# Patient Record
Sex: MALE | Race: WHITE | URBAN - METROPOLITAN AREA
[De-identification: names, ages, dates, MRNs, and addresses within clinical notes are randomized per-mention and may not be internally consistent; named-entity substitution may affect disease eponyms.]

---

## 2017-03-06 ENCOUNTER — OFFICE VISIT (OUTPATIENT)
Dept: FAMILY MEDICINE | Facility: CLINIC | Age: 62
End: 2017-03-06
Payer: COMMERCIAL

## 2017-03-06 VITALS
TEMPERATURE: 97.8 F | BODY MASS INDEX: 21.43 KG/M2 | WEIGHT: 158.2 LBS | DIASTOLIC BLOOD PRESSURE: 70 MMHG | HEART RATE: 95 BPM | SYSTOLIC BLOOD PRESSURE: 116 MMHG | HEIGHT: 72 IN | OXYGEN SATURATION: 95 %

## 2017-03-06 DIAGNOSIS — M79.671 FOOT PAIN, BILATERAL: Primary | ICD-10-CM

## 2017-03-06 DIAGNOSIS — Z72.0 TOBACCO ABUSE: ICD-10-CM

## 2017-03-06 DIAGNOSIS — M79.672 FOOT PAIN, BILATERAL: Primary | ICD-10-CM

## 2017-03-06 DIAGNOSIS — Q66.70 HIGH ARCHES: ICD-10-CM

## 2017-03-06 DIAGNOSIS — R91.8 PULMONARY NODULES: ICD-10-CM

## 2017-03-06 DIAGNOSIS — R91.1 PULMONARY NODULE: ICD-10-CM

## 2017-03-06 DIAGNOSIS — F17.200 TOBACCO USE DISORDER: ICD-10-CM

## 2017-03-06 PROCEDURE — 99214 OFFICE O/P EST MOD 30 MIN: CPT | Performed by: FAMILY MEDICINE

## 2017-03-06 RX ORDER — VARENICLINE TARTRATE 1 MG/1
1 TABLET, FILM COATED ORAL 2 TIMES DAILY
Qty: 56 TABLET | Refills: 2 | Status: SHIPPED | OUTPATIENT
Start: 2017-03-06

## 2017-03-06 NOTE — PATIENT INSTRUCTIONS
[facility]    If you have any questions regarding to your visit please contact your care team:       Team Purple:   Clinic Hours Telephone Number   [address] Dr.   7am-7pm  Monday - Thursday   7am-5pm  Fridays  [phone]  (Appointment scheduling available 24/7)    Questions about your Visit?   Team Line:  [phone]   Urgent Care - [place] and [facility] - 11am-9pm Monday-Friday Saturday-Sunday- 9am-5pm   [place] - 5pm-9pm Monday-Friday Saturday-Sunday- 9am-5pm  [phone] - [facility]  [phone] - [place]       What options do I have for visits at the clinic other than the traditional office visit?  To expand how we care for you, many of our providers are utilizing electronic visits (e-visits) and telephone visits, when medically appropriate, for interactions with their patients rather than a visit in the clinic.   We also offer nurse visits for many medical concerns. Just like any other service, we will bill your insurance company for this type of visit based on time spent on the phone with your provider. Not all insurance companies cover these visits. Please check with your medical insurance if this type of visit is covered. You will be responsible for any charges that are not paid by your insurance.      E-visits via [employer]:  generally incur a $35.00 fee.  Telephone visits:  Time spent on the phone: *charged based on time that is spent on the phone in increments of 10 minutes. Estimated cost:   5-10 mins $30.00   11-20 mins. $59.00   21-30 mins. $85.00     Use [employer] (secure email communication and access to your chart) to send your primary care provider a message or make an appointment. Ask someone on your Team how to sign up for [employer].  For a Price Quote for your services, please call our Consumer Price Line at [phone].  As always, Thank you for trusting us with your health care  needs!    Discharged By: An

## 2017-03-06 NOTE — NURSING NOTE
Chief Complaint   Patient presents with     Foot Pain     left foot pain x 6 months       Initial /70  Pulse 95  Temp 97.8  F (36.6  C) (Oral)  Ht 6' (1.829 m)  Wt 158 lb 3.2 oz (71.8 kg)  SpO2 95%  BMI 21.46 kg/m2 Estimated body mass index is 21.46 kg/(m^2) as calculated from the following:    Height as of this encounter: 6' (1.829 m).    Weight as of this encounter: 158 lb 3.2 oz (71.8 kg).  Medication Reconciliation: complete     An PINEDA Don

## 2017-03-06 NOTE — PROGRESS NOTES
SUBJECTIVE:                                                    Reese Torres is a 61 year old male who presents to clinic today for the following health issues:    Musculoskeletal problem/pain      Duration: 6 month     Description  Location: bilateral foot pain but left foot is the worse     Intensity:  mild    Accompanying signs and symptoms: none    History  Previous similar problem: no   Previous evaluation:  none    Precipitating or alleviating factors:  Trauma or overuse: no   Aggravating factors include: walking    Therapies tried and outcome: nothing     Pain is in the forefoot; in the left a little on the right foot.  While walking (when puts weight on the ball of the foot), has needle prick sensation in some toes (middle ones).  It is more persistent lately.    Problem list and histories reviewed & adjusted, as indicated.  Additional history: as documented    Patient Active Problem List   Diagnosis     Hyperlipidemia LDL goal <100     Prediabetes     History reviewed. No pertinent past surgical history.    Social History   Substance Use Topics     Smoking status: Current Every Day Smoker     Types: Cigarettes     Smokeless tobacco: Not on file     Alcohol use Yes     Family History   Problem Relation Age of Onset     Glaucoma Father          Reviewed and updated as needed this visit by Provider    ROS:  Constitutional, HEENT, cardiovascular, pulmonary, gi and gu systems are negative, except as otherwise noted.    OBJECTIVE:                                                    /70  Pulse 95  Temp 97.8  F (36.6  C) (Oral)  Ht 6' (1.829 m)  Wt 158 lb 3.2 oz (71.8 kg)  SpO2 95%  BMI 21.46 kg/m2  Body mass index is 21.46 kg/(m^2).  GENERAL: healthy, alert and no distress  NECK: no adenopathy and thyroid normal to palpation  RESP: lungs clear to auscultation - no rales, rhonchi or wheezes  CV: regular rate and rhythm, no murmur, click or rub, no peripheral edema   ABDOMEN: soft, nontender, no masses  and bowel sounds normal  MS:   Feet:   - normal skin color with good sensation and pulses bilaterally.   - high arches   - vague tenderness on the left fore foot below mid toes          ASSESSMENT/PLAN:                                                    (M79.671,  M79.672) Foot pain, bilateral  (primary encounter diagnosis)  Comment: Differentials: Nerve entrapment. neuroma, metatarsalgia. Discussed evaluation by the foot specialist  Plan: Podiatry    (Q66.7) High arches: feet, Active   Plan: Podiatry evaluation    (F17.200) Tobacco use disorder  Comment: He is in the process of quitting. On chantix. Quit date this Friday; 3/10/17 when started chantix  Plan: Commended him for the efforts and encouraged his to keep working towards quitting completely and staying tobacco free.            Refill chantix.    (R91.8) Pulmonary nodules  Comment: Found on low dose CT scan 3/31/2016 follow up recommended in 1 year  Plan: Ordered CT scan    Follow up in 1 month or sooner with concerns    Lewis Puga MD  [facility]

## 2017-03-06 NOTE — MR AVS SNAPSHOT
After Visit Summary   3/6/2017    Reese Torres    MRN: [id number]           Patient Information     Date Of Birth          1955        Visit Information        Provider Department      3/6/2017 5:00 PM Lewis Puga MD [facility]        Today's Diagnoses     Foot pain, bilateral    -  1    High arches: feet        Tobacco use disorder        Pulmonary nodules          Care Instructions    [facility]    If you have any questions regarding to your visit please contact your care team:       Team Purple:   Clinic Hours Telephone Number   [address]IR [address] Dr.   7am-7pm  Monday - Thursday   7am-5pm  Fridays  [phone]  (Appointment scheduling available 24/7)    Questions about your Visit?   Team Line:  [phone]   Urgent Care - [place] and [facility] - 11am-9pm Monday-Friday Saturday-Sunday- 9am-5pm   [place] - 5pm-9pm Monday-Friday Saturday-Sunday- 9am-5pm  [phone] - [facility]  [phone] - [place]       What options do I have for visits at the clinic other than the traditional office visit?  To expand how we care for you, many of our providers are utilizing electronic visits (e-visits) and telephone visits, when medically appropriate, for interactions with their patients rather than a visit in the clinic.   We also offer nurse visits for many medical concerns. Just like any other service, we will bill your insurance company for this type of visit based on time spent on the phone with your provider. Not all insurance companies cover these visits. Please check with your medical insurance if this type of visit is covered. You will be responsible for any charges that are not paid by your insurance.      E-visits via [employer]:  generally incur a $35.00 fee.  Telephone visits:  Time spent on the phone: *charged based on time that is spent on the phone in increments of 10  minutes. Estimated cost:   5-10 mins $30.00   11-20 mins. $59.00   21-30 mins. $85.00     Use [employer] (secure email communication and access to your chart) to send your primary care provider a message or make an appointment. Ask someone on your Team how to sign up for [employer].  For a Price Quote for your services, please call our Good Deal Line at [phone].  As always, Thank you for trusting us with your health care needs!    Discharged By: An          Follow-ups after your visit        Additional Services     PODIATRY/FOOT & ANKLE SURGERY REFERRAL       Your provider [place] you to: [facility] [facility] [phone]   [email or web address]    Please be aware that coverage of these services is subject to the terms and limitations of your health insurance plan.  Call member services at your health plan with any benefit or coverage questions.      Please bring the following to your appointment:  >>   Any x-rays, CTs or MRIs which have been performed.  Contact the facility where they were done to arrange for  prior to your scheduled appointment.    >>   List of current medications   >>   This referral request   >>   Any documents/labs given to you for this referral                  Who to contact     If you have questions or need follow up information about today's clinic visit or your schedule please contact [facility] directly at [phone].  Normal or non-critical lab and imaging results will be communicated to you by MyChart, letter or phone within 4 business days after the clinic has received the results. If you do not hear from us within 7 days, please contact the clinic through [employer] or phone. If you have a critical or abnormal lab result, we will notify you by phone as soon as possible.  Submit refill requests through AlephD or call your pharmacy and they will forward the refill request to us. Please allow 3 business days for your  "refill to be completed.          Additional Information About Your Visit        [employer] Information     [employer] lets you send messages to your doctor, view your test results, renew your prescriptions, schedule appointments and more. To sign up, go to [email or web address] . Click on \"Log in\" on the left side of the screen, which will take you to the Welcome page. Then click on \"Sign up Now\" on the right side of the page.     You will be asked to enter the access code listed below, as well as some personal information. Please follow the directions to create your username and password.     Your access code is: [email or web address]  Expires: 3/20/2017  8:03 AM     Your access code will  in 90 days. If you need help or a new code, please call your [place] clinic or [phone].        Care EveryWhere ID     This is your Care EveryWhere ID. This could be used by other organizations to access your [place] medical records  [id number]        Your Vitals Were     Pulse Temperature Height Pulse Oximetry BMI (Body Mass Index)       95 97.8  F (36.6  C) (Oral) 6' (1.829 m) 95% 21.46 kg/m2        Blood Pressure from Last 3 Encounters:   17 116/70   16 106/60   16 110/65    Weight from Last 3 Encounters:   17 158 lb 3.2 oz (71.8 kg)   16 160 lb (72.6 kg)   16 161 lb (73 kg)              We Performed the Following     PODIATRY/FOOT & ANKLE SURGERY REFERRAL          Today's Medication Changes          These changes are accurate as of: 3/6/17  5:29 PM.  If you have any questions, ask your nurse or doctor.               These medicines have changed or have updated prescriptions.        Dose/Directions    * CHANTIX STARTING MONTH CAMILLE 0.5 MG X 11 & 1 MG X 42 tablet   This may have changed:  Another medication with the same name was added. Make sure you understand how and when to take each.   Generic drug:  varenicline   Changed by:  Lewis Puga MD        Dose:  1 tablet   Take 1 " tablet by mouth 2 times daily   Refills:  0       * varenicline 1 MG tablet   Commonly known as:  CHANTIX   This may have changed:  You were already taking a medication with the same name, and this prescription was added. Make sure you understand how and when to take each.   Used for:  Tobacco use disorder   Changed by:  Lewis Puga MD        Dose:  1 mg   Take 1 tablet (1 mg) by mouth 2 times daily   Quantity:  56 tablet   Refills:  2       * Notice:  This list has 2 medication(s) that are the same as other medications prescribed for you. Read the directions carefully, and ask your doctor or other care provider to review them with you.         Where to get your medicines      These medications were sent to [facility] [place], [place] - [address]  [address], [place] [address]     Phone:  [phone]     varenicline 1 MG tablet                Primary Care Provider Office Phone #    [facility] [phone]       No address on file        Thank you!     Thank you for choosing [facility]  for your care. Our goal is always to provide you with excellent care. Hearing back from our patients is one way we can continue to improve our services. Please take a few minutes to complete the written survey that you may receive in the mail after your visit with us. Thank you!             Your Updated Medication List - Protect others around you: Learn how to safely use, store and throw away your medicines at www.disposemymeds.org.          This list is accurate as of: 3/6/17  5:29 PM.  Always use your most recent med list.                   Brand Name Dispense Instructions for use    * CHANTIX STARTING MONTH CAMILLE 0.5 MG X 11 & 1 MG X 42 tablet   Generic drug:  varenicline      Take 1 tablet by mouth 2 times daily       * varenicline 1 MG tablet    CHANTIX    56 tablet    Take 1 tablet (1 mg) by mouth 2 times daily       * Notice:  This list has 2  medication(s) that are the same as other medications prescribed for you. Read the directions carefully, and ask your doctor or other care provider to review them with you.

## 2017-03-17 ENCOUNTER — RADIANT APPOINTMENT (OUTPATIENT)
Dept: CT IMAGING | Facility: CLINIC | Age: 62
End: 2017-03-17
Attending: FAMILY MEDICINE
Payer: COMMERCIAL

## 2017-03-17 DIAGNOSIS — R91.1 PULMONARY NODULE: ICD-10-CM

## 2017-03-17 DIAGNOSIS — Z72.0 TOBACCO ABUSE: ICD-10-CM

## 2017-03-17 PROCEDURE — 71250 CT THORAX DX C-: CPT | Mod: TC

## 2017-03-23 ENCOUNTER — OFFICE VISIT (OUTPATIENT)
Dept: PODIATRY | Facility: CLINIC | Age: 62
End: 2017-03-23
Payer: COMMERCIAL

## 2017-03-23 ENCOUNTER — RADIANT APPOINTMENT (OUTPATIENT)
Dept: GENERAL RADIOLOGY | Facility: CLINIC | Age: 62
End: 2017-03-23
Attending: PODIATRIST
Payer: COMMERCIAL

## 2017-03-23 VITALS — WEIGHT: 158 LBS | OXYGEN SATURATION: 95 % | BODY MASS INDEX: 21.43 KG/M2 | HEART RATE: 100 BPM

## 2017-03-23 DIAGNOSIS — D36.13 NEUROMA OF FOOT: ICD-10-CM

## 2017-03-23 DIAGNOSIS — D36.13 NEUROMA OF FOOT: Primary | ICD-10-CM

## 2017-03-23 PROCEDURE — 73630 X-RAY EXAM OF FOOT: CPT | Mod: LT

## 2017-03-23 PROCEDURE — 99243 OFF/OP CNSLTJ NEW/EST LOW 30: CPT | Performed by: PODIATRIST

## 2017-03-23 NOTE — PROGRESS NOTES
Subjective:    Pt is seen today in consult from Dr. Puga.  Pt has 4 month history of left foot pain.  Describes as a burning or tingling sensation which is aggravated with weightbearing and relieved by rest.  Points to 3rd interspace. Also describes the feeling of the sock balled up in the end of the shoe.  Denies any history of trauma.  Positive for numbness.  Smoker.  Works as .     ROS:  No hx of wound healing problems, denies numbness on any other toes, denies edema, denies weakness, denies increased deformity.     No Known Allergies    Current Outpatient Prescriptions   Medication Sig Dispense Refill     varenicline (CHANTIX STARTING MONTH PAK) 0.5 MG X 11 & 1 MG X 42 tablet Take 1 tablet by mouth 2 times daily       varenicline (CHANTIX) 1 MG tablet Take 1 tablet (1 mg) by mouth 2 times daily 56 tablet 2       Patient Active Problem List   Diagnosis     Hyperlipidemia LDL goal <100     Prediabetes       No past medical history on file.    No past surgical history on file.    Family History   Problem Relation Age of Onset     Glaucoma Father        Social History   Substance Use Topics     Smoking status: Current Every Day Smoker     Types: Cigarettes     Smokeless tobacco: Not on file     Alcohol use Yes         Pulse 100  Wt 71.7 kg (158 lb)  SpO2 95%  BMI 21.43 kg/m2.  Good historian.  A&O x 3.  Smells of [place].  Pulses DP, PT 2/4 b/l.  CRT < 3 seconds X 10 digits.  No edema or varicosities noted.  Sensation to light touch intact b/l.  Reflexes 2/4 b/l.  Skin has normal texture and turgor b/l.  Cavus arch with weightbearing.  No forefoot or rear foot deformities noted.  MS 5/5 all compartments.  Normal ROM all fore foot and rearfoot joints.  No equinus.   Pain upon palpation to the left third intermetatarsal space.    Positive amrita's sign noted.  No erythema or subcutaneous masses noted.  No pain on the met heads or dorsal on the metatarsal necks.  Negative Tinnel's sign.  X-rays  normal      Assessment: Neuroma third Intermetatarsal Space left foot    Plan:  X-rays taken today.  Disscussed etiology and treatment options at great detail.  Recommended changing shoewear to wide shoes, limiting periods of standing, and not going barefoot.  patient will also get stiff shoes and avoid activities that bother this.  dispensed metatarsal pad to offload this.  Discussed injection today but he declines.  Discussed other treatment options if this fails.  RTC PRN.  Thank you for allowing me participate in the care of this patient.        Alberto Clark DPM, FACFAS

## 2017-03-23 NOTE — PATIENT INSTRUCTIONS
SMOKING CESSATION    What's in cigarette smoke? - Cigarette smoke contains over 4,000 chemicals. Nicotine is one of the main ingredients which is an insecticide/herbicide. It is poisonous to our nervous system, increases blood clotting risk, and decreases the body's defenses to fight off infection. Another chemical is Carbon Monoxide is an asphyxiating gas that permanently binds to blood cells and blocks the transport of oxygen. This leads to tissue death and decreases your metabolism. Tar is a chemical that coats your lungs and trachea which impairs new oxygen coming in and carbon dioxide getting out of your body.   How does smoking impact surgery? - Smoking is particularly hazardous with regards to surgery. Surgery puts stress on the body and a smoker's body is already under strain from these chemicals. Putting the two together, especially for an elective surgery, could be a recipe for disaster. Smoking before and after surgery increases your risk of heart problems, slow wound healing, delayed bone healing, blood clots, wound infection and anesthesia complications.   What are the benefits of quitting? - In 20 minutes your blood pressure will drop back down to normal. In 8 hours the carbon monoxide (a toxic gas) levels in your blood stream will drop by half, and oxygen levels will return to normal. In 48 hours your chance of having a heart attack will have decreased. All nicotine will have left your body. Your sense of taste and smell will return to a normal level. In 72 hours your bronchial tubes will relax, and your energy levels will increase. In 2 weeks your circulation will increase, and it will continue to improve for the next 10 weeks.    Recommendations for elective surgery - Ideally, patients should quit smoking 8 weeks before and at least 2 weeks after elective surgery in order to avoid complications. Simply cutting back on the amount of cigarettes smoked per day does not offer any benefit or decrease the  risk of poor wound healing, heart problems, and infection. Smokers should also start taking Vitamin C and B for two weeks before surgery and two weeks after surgery.    Ways to Stop Smokin. Nicotine patches, lozenges, or gum  2. Support Groups  3. Medications (see below)    List of Medications:  1. Varenicline Tartrate (CHANTIX)   2. Bupropion HCL (WELLBUTRIN, ZYBAN)   note: make sure Wellbutrin is for smoking cessation and not other issues   3. Nicotine Patch (NICODERM)   4. Nicotine Inhaler (NICOTROL)   5. Nicotine Gum Nicotine Polacrilex   6. Nicotine Lozenge: Nicotine Polacrilex (COMMIT)   * [employer] offers a smoking support group as well!  Please visit: [email or web address]  If you are interested in these, ask about getting a prescription or talk to your primary care doctor about what may be the best way for you to quit.       Weight management plan: Patient was referred to their PCP to discuss a diet and exercise plan.

## 2017-03-23 NOTE — NURSING NOTE
Chief Complaint   Patient presents with     Foot Pain     Left foot       Initial Pulse 100  Wt 71.7 kg (158 lb)  SpO2 95%  BMI 21.43 kg/m2 Estimated body mass index is 21.43 kg/(m^2) as calculated from the following:    Height as of 3/6/17: 1.829 m (6').    Weight as of this encounter: 71.7 kg (158 lb).  Medication Reconciliation: complete

## 2017-03-23 NOTE — MR AVS SNAPSHOT
After Visit Summary   3/23/2017    Reese Torres    MRN: [id number]           Patient Information     Date Of Birth          1955        Visit Information        Provider Department      3/23/2017 5:15 PM Alberto Clark DPM [facility] Instructions    SMOKING CESSATION    What's in cigarette smoke? - Cigarette smoke contains over 4,000 chemicals. Nicotine is one of the main ingredients which is an insecticide/herbicide. It is poisonous to our nervous system, increases blood clotting risk, and decreases the body's defenses to fight off infection. Another chemical is Carbon Monoxide is an asphyxiating gas that permanently binds to blood cells and blocks the transport of oxygen. This leads to tissue death and decreases your metabolism. Tar is a chemical that coats your lungs and trachea which impairs new oxygen coming in and carbon dioxide getting out of your body.   How does smoking impact surgery? - Smoking is particularly hazardous with regards to surgery. Surgery puts stress on the body and a smoker's body is already under strain from these chemicals. Putting the two together, especially for an elective surgery, could be a recipe for disaster. Smoking before and after surgery increases your risk of heart problems, slow wound healing, delayed bone healing, blood clots, wound infection and anesthesia complications.   What are the benefits of quitting? - In 20 minutes your blood pressure will drop back down to normal. In 8 hours the carbon monoxide (a toxic gas) levels in your blood stream will drop by half, and oxygen levels will return to normal. In 48 hours your chance of having a heart attack will have decreased. All nicotine will have left your body. Your sense of taste and smell will return to a normal level. In 72 hours your bronchial tubes will relax, and your energy levels will increase. In 2 weeks your circulation will increase, and it will continue to  improve for the next 10 weeks.    Recommendations for elective surgery - Ideally, patients should quit smoking 8 weeks before and at least 2 weeks after elective surgery in order to avoid complications. Simply cutting back on the amount of cigarettes smoked per day does not offer any benefit or decrease the risk of poor wound healing, heart problems, and infection. Smokers should also start taking Vitamin C and B for two weeks before surgery and two weeks after surgery.    Ways to Stop Smokin. Nicotine patches, lozenges, or gum  2. Support Groups  3. Medications (see below)    List of Medications:  1. Varenicline Tartrate (CHANTIX)   2. Bupropion HCL (WELLBUTRIN, ZYBAN) - note: make sure Wellbutrin is for smoking cessation and not other issues   3. Nicotine Patch (NICODERM)   4. Nicotine Inhaler (NICOTROL)   5. Nicotine Gum Nicotine Polacrilex   6. Nicotine Lozenge: Nicotine Polacrilex (COMMIT)   * [employer] offers a smoking support group as well!  Please visit: [email or web address]  If you are interested in these, ask about getting a prescription or talk to your primary care doctor about what may be the best way for you to quit.       Weight management plan: Patient was referred to their PCP to discuss a diet and exercise plan.          Follow-ups after your visit        Who to contact     If you have questions or need follow up information about today's clinic visit or your schedule please contact [facility] directly at [phone].  Normal or non-critical lab and imaging results will be communicated to you by MyChart, letter or phone within 4 business days after the clinic has received the results. If you do not hear from us within 7 days, please contact the clinic through [employer] or phone. If you have a critical or abnormal lab result, we will notify you by phone as soon as possible.  Submit refill requests through [employer] or call your pharmacy and they will forward the  "refill request to us. Please allow 3 business days for your refill to be completed.          Additional Information About Your Visit        [employer] Information     "[employer] lets you send messages to your doctor, view your test results, renew your prescriptions, schedule appointments and more. To sign up, go to [email or web address] . Click on \"Log in\" on the left side of the screen, which will take you to the Welcome page. Then click on \"Sign up Now\" on the right side of the page.     You will be asked to enter the access code listed below, as well as some personal information. Please follow the directions to create your username and password.     Your access code is: [email or web address]  Expires: 2017  5:15 PM     Your access code will  in 90 days. If you need help or a new code, please call your [facility] clinic or [phone].        Care EveryWhere ID     This is your Care EveryWhere ID. This could be used by other organizations to access your [facility] medical records  [id number]        Your Vitals Were     Pulse Pulse Oximetry BMI (Body Mass Index)             100 95% 21.43 kg/m2          Blood Pressure from Last 3 Encounters:   17 116/70   16 106/60   16 110/65    Weight from Last 3 Encounters:   17 71.7 kg (158 lb)   17 71.8 kg (158 lb 3.2 oz)   16 72.6 kg (160 lb)              Today, you had the following     No orders found for display       Primary Care Provider Office Phone #    [facility] [phone]       No address on file        Thank you!     Thank you for choosing [facility]  for your care. Our goal is always to provide you with excellent care. Hearing back from our patients is one way we can continue to improve our services. Please take a few minutes to complete the written survey that you may receive in the mail after your visit with us. Thank you!             Your Updated Medication List - Protect others around you: Learn how " to safely use, store and throw away your medicines at www.disposemymeds.org.          This list is accurate as of: 3/23/17  5:15 PM.  Always use your most recent med list.                   Brand Name Dispense Instructions for use    * CHANTIX STARTING MONTH CAMILLE 0.5 MG X 11 & 1 MG X 42 tablet   Generic drug:  varenicline      Take 1 tablet by mouth 2 times daily       * varenicline 1 MG tablet    CHANTIX    56 tablet    Take 1 tablet (1 mg) by mouth 2 times daily       * Notice:  This list has 2 medication(s) that are the same as other medications prescribed for you. Read the directions carefully, and ask your doctor or other care provider to review them with you.

## 2018-08-22 ENCOUNTER — TELEPHONE (OUTPATIENT)
Dept: FAMILY MEDICINE | Facility: CLINIC | Age: 63
End: 2018-08-22

## 2018-09-04 NOTE — TELEPHONE ENCOUNTER
9/4/2018    Call Regarding Lung Screening     Attempt 2    Message on voicemail     Comments:       Outreach   CC

## 2018-10-03 NOTE — TELEPHONE ENCOUNTER
10/3/2018    Call Regarding Lung Cancer Screening      Attempt 3    Message left with female    Comments:       Outreach   SV